# Patient Record
Sex: MALE | Race: BLACK OR AFRICAN AMERICAN | NOT HISPANIC OR LATINO | URBAN - METROPOLITAN AREA
[De-identification: names, ages, dates, MRNs, and addresses within clinical notes are randomized per-mention and may not be internally consistent; named-entity substitution may affect disease eponyms.]

---

## 2024-01-20 ENCOUNTER — HOSPITAL ENCOUNTER (EMERGENCY)
Facility: HOSPITAL | Age: 44
Discharge: HOME | End: 2024-01-20
Attending: EMERGENCY MEDICINE | Admitting: EMERGENCY MEDICINE

## 2024-01-20 VITALS
SYSTOLIC BLOOD PRESSURE: 144 MMHG | DIASTOLIC BLOOD PRESSURE: 69 MMHG | OXYGEN SATURATION: 99 % | HEART RATE: 89 BPM | WEIGHT: 235.1 LBS | TEMPERATURE: 97.3 F | RESPIRATION RATE: 18 BRPM

## 2024-01-20 DIAGNOSIS — R11.2 NAUSEA AND VOMITING, UNSPECIFIED VOMITING TYPE: Primary | ICD-10-CM

## 2024-01-20 LAB
ALBUMIN SERPL BCP-MCNC: 4.2 G/DL (ref 3.5–5)
ALP SERPL-CCNC: 38 U/L (ref 34–104)
ALT SERPL W P-5'-P-CCNC: 18 U/L (ref 7–52)
ANION GAP SERPL CALCULATED.3IONS-SCNC: 7 MMOL/L
AST SERPL W P-5'-P-CCNC: 21 U/L (ref 13–39)
BASOPHILS # BLD AUTO: 0.02 THOUSANDS/ÂΜL (ref 0–0.1)
BASOPHILS NFR BLD AUTO: 0 % (ref 0–1)
BILIRUB SERPL-MCNC: 0.55 MG/DL (ref 0.2–1)
BUN SERPL-MCNC: 14 MG/DL (ref 5–25)
CALCIUM SERPL-MCNC: 9.4 MG/DL (ref 8.4–10.2)
CHLORIDE SERPL-SCNC: 109 MMOL/L (ref 96–108)
CO2 SERPL-SCNC: 22 MMOL/L (ref 21–32)
CREAT SERPL-MCNC: 0.98 MG/DL (ref 0.6–1.3)
EOSINOPHIL # BLD AUTO: 0.13 THOUSAND/ÂΜL (ref 0–0.61)
EOSINOPHIL NFR BLD AUTO: 1 % (ref 0–6)
ERYTHROCYTE [DISTWIDTH] IN BLOOD BY AUTOMATED COUNT: 12.9 % (ref 11.6–15.1)
GFR SERPL CREATININE-BSD FRML MDRD: 94 ML/MIN/1.73SQ M
GLUCOSE SERPL-MCNC: 93 MG/DL (ref 65–140)
HCT VFR BLD AUTO: 45.1 % (ref 36.5–49.3)
HGB BLD-MCNC: 14.4 G/DL (ref 12–17)
IMM GRANULOCYTES # BLD AUTO: 0.04 THOUSAND/UL (ref 0–0.2)
IMM GRANULOCYTES NFR BLD AUTO: 0 % (ref 0–2)
LIPASE SERPL-CCNC: 80 U/L (ref 11–82)
LYMPHOCYTES # BLD AUTO: 1.27 THOUSANDS/ÂΜL (ref 0.6–4.47)
LYMPHOCYTES NFR BLD AUTO: 13 % (ref 14–44)
MCH RBC QN AUTO: 27.5 PG (ref 26.8–34.3)
MCHC RBC AUTO-ENTMCNC: 31.9 G/DL (ref 31.4–37.4)
MCV RBC AUTO: 86 FL (ref 82–98)
MONOCYTES # BLD AUTO: 0.67 THOUSAND/ÂΜL (ref 0.17–1.22)
MONOCYTES NFR BLD AUTO: 7 % (ref 4–12)
NEUTROPHILS # BLD AUTO: 7.39 THOUSANDS/ÂΜL (ref 1.85–7.62)
NEUTS SEG NFR BLD AUTO: 79 % (ref 43–75)
NRBC BLD AUTO-RTO: 0 /100 WBCS
PLATELET # BLD AUTO: 141 THOUSANDS/UL (ref 149–390)
PMV BLD AUTO: 10.3 FL (ref 8.9–12.7)
POTASSIUM SERPL-SCNC: 3.8 MMOL/L (ref 3.5–5.3)
PROT SERPL-MCNC: 7.4 G/DL (ref 6.4–8.4)
RBC # BLD AUTO: 5.23 MILLION/UL (ref 3.88–5.62)
SODIUM SERPL-SCNC: 138 MMOL/L (ref 135–147)
WBC # BLD AUTO: 9.52 THOUSAND/UL (ref 4.31–10.16)

## 2024-01-20 PROCEDURE — 36415 COLL VENOUS BLD VENIPUNCTURE: CPT

## 2024-01-20 PROCEDURE — 99284 EMERGENCY DEPT VISIT MOD MDM: CPT | Performed by: EMERGENCY MEDICINE

## 2024-01-20 PROCEDURE — 80053 COMPREHEN METABOLIC PANEL: CPT

## 2024-01-20 PROCEDURE — 83690 ASSAY OF LIPASE: CPT

## 2024-01-20 PROCEDURE — 99283 EMERGENCY DEPT VISIT LOW MDM: CPT

## 2024-01-20 PROCEDURE — 85025 COMPLETE CBC W/AUTO DIFF WBC: CPT

## 2024-01-20 RX ORDER — ONDANSETRON 4 MG/1
4 TABLET, FILM COATED ORAL EVERY 8 HOURS PRN
Qty: 20 TABLET | Refills: 0 | Status: SHIPPED | OUTPATIENT
Start: 2024-01-20

## 2024-01-20 RX ORDER — ONDANSETRON 4 MG/1
4 TABLET, ORALLY DISINTEGRATING ORAL ONCE
Status: COMPLETED | OUTPATIENT
Start: 2024-01-20 | End: 2024-01-20

## 2024-01-20 RX ADMIN — ONDANSETRON 4 MG: 4 TABLET, ORALLY DISINTEGRATING ORAL at 09:25

## 2024-01-20 NOTE — ED PROVIDER NOTES
"History  Chief Complaint   Patient presents with    Vomiting     Vomiting since this morning, still feeling nauseated. Wife said that \"this is unlike him we both had the same food last night and I feel fine\" pt adds that he has a cramping and after he vomited he briefly felt better so attempted to drink water and \"it came back up\"     43-year-old male who presents to the emergency department for nausea and vomiting started early this morning.  Patient states he had tacos and febrile last night for dinner and has not eaten since.  Reports continued nausea at this time.  Patient's wife states that patient does not go see a doctor however it is unlike him to be \"sick\".  Family history of diabetes in father as well as history of thyroid cancer in family members.  Patient states he had abdominal pain when symptoms initially started however that has resolved.  Patient denies any chest pain, shortness of breath, fever, hematemesis, diarrhea, constipation, dysuria, hematuria, back pain, hematochezia.       History provided by:  Patient      None       History reviewed. No pertinent past medical history.    History reviewed. No pertinent surgical history.    History reviewed. No pertinent family history.  I have reviewed and agree with the history as documented.    E-Cigarette/Vaping     E-Cigarette/Vaping Substances     Social History     Tobacco Use    Smoking status: Never    Smokeless tobacco: Never   Substance Use Topics    Alcohol use: Not Currently    Drug use: Yes     Types: Marijuana        Review of Systems   Constitutional:  Negative for chills and fever.   HENT:  Negative for ear pain and sore throat.    Eyes:  Negative for pain and visual disturbance.   Respiratory:  Negative for cough and shortness of breath.    Cardiovascular:  Negative for chest pain and palpitations.   Gastrointestinal:  Positive for abdominal pain (resolved), nausea and vomiting. Negative for blood in stool, constipation and diarrhea.       "  Denies hematemesis, hematochezia   Genitourinary:  Negative for dysuria and hematuria.   Musculoskeletal:  Negative for arthralgias and back pain.   Skin:  Negative for color change and rash.   Neurological:  Negative for seizures and syncope.   All other systems reviewed and are negative.      Physical Exam  ED Triage Vitals [01/20/24 0908]   Temperature Pulse Respirations Blood Pressure SpO2   (!) 97.3 °F (36.3 °C) 89 18 144/69 99 %      Temp Source Heart Rate Source Patient Position - Orthostatic VS BP Location FiO2 (%)   Tympanic Monitor Sitting Left arm --      Pain Score       5             Orthostatic Vital Signs  Vitals:    01/20/24 0908   BP: 144/69   Pulse: 89   Patient Position - Orthostatic VS: Sitting       Physical Exam  Vitals and nursing note reviewed.   Constitutional:       General: He is not in acute distress.     Appearance: He is well-developed.   HENT:      Head: Normocephalic and atraumatic.      Right Ear: External ear normal.      Left Ear: External ear normal.      Mouth/Throat:      Mouth: Mucous membranes are moist.   Eyes:      Conjunctiva/sclera: Conjunctivae normal.   Cardiovascular:      Rate and Rhythm: Normal rate and regular rhythm.      Heart sounds: No murmur heard.  Pulmonary:      Effort: Pulmonary effort is normal. No respiratory distress.      Breath sounds: Normal breath sounds.   Abdominal:      Palpations: Abdomen is soft.      Tenderness: There is no abdominal tenderness. There is no guarding or rebound.   Musculoskeletal:         General: No swelling.      Cervical back: Neck supple.   Skin:     General: Skin is warm and dry.      Capillary Refill: Capillary refill takes less than 2 seconds.   Neurological:      General: No focal deficit present.      Mental Status: He is alert and oriented to person, place, and time.   Psychiatric:         Mood and Affect: Mood normal.         ED Medications  Medications   ondansetron (ZOFRAN-ODT) dispersible tablet 4 mg (4 mg Oral  Given 1/20/24 0925)       Diagnostic Studies  Results Reviewed       Procedure Component Value Units Date/Time    Comprehensive metabolic panel [[id number]]  (Abnormal) Collected: 01/20/24 0936    Lab Status: Final result Specimen: Blood from Hand, Right Updated: 01/20/24 1004     Sodium 138 mmol/L      Potassium 3.8 mmol/L      Chloride 109 mmol/L      CO2 22 mmol/L      ANION GAP 7 mmol/L      BUN 14 mg/dL      Creatinine 0.98 mg/dL      Glucose 93 mg/dL      Calcium 9.4 mg/dL      AST 21 U/L      ALT 18 U/L      Alkaline Phosphatase 38 U/L      Total Protein 7.4 g/dL      Albumin 4.2 g/dL      Total Bilirubin 0.55 mg/dL      eGFR 94 ml/min/1.73sq m     Narrative:      National Kid[employer] Foundation guidelines for Chronic Kidney Disease (CKD):     Stage 1 with normal or high GFR (GFR > 90 mL/min/1.73 square meters)    Stage 2 Mild CKD (GFR = 60-89 mL/min/1.73 square meters)    Stage 3A Moderate CKD (GFR = 45-59 mL/min/1.73 square meters)    Stage 3B Moderate CKD (GFR = 30-44 mL/min/1.73 square meters)    Stage 4 Severe CKD (GFR = 15-29 mL/min/1.73 square meters)    Stage 5 End Stage CKD (GFR <15 mL/min/1.73 square meters)  Note: GFR calculation is accurate only with a steady state creatinine    Lipase [[id number]]  (Normal) Collected: 01/20/24 0936    Lab Status: Final result Specimen: Blood from Hand, Right Updated: 01/20/24 1004     Lipase 80 u/L     CBC and differential [[id number]]  (Abnormal) Collected: 01/20/24 0936    Lab Status: Final result Specimen: Blood from Hand, Right Updated: 01/20/24 0943     WBC 9.52 Thousand/uL      RBC 5.23 Million/uL      Hemoglobin 14.4 g/dL      Hematocrit 45.1 %      MCV 86 fL      MCH 27.5 pg      MCHC 31.9 g/dL      RDW 12.9 %      MPV 10.3 fL      Platelets 141 Thousands/uL      nRBC 0 /100 WBCs      Neutrophils Relative 79 %      Immat GRANS % 0 %      Lymphocytes Relative 13 %      Monocytes Relative 7 %      Eosinophils Relative 1 %      Basophils Relative 0 %       "Neutrophils Absolute 7.39 Thousands/µL      Immature Grans Absolute 0.04 Thousand/uL      Lymphocytes Absolute 1.27 Thousands/µL      Monocytes Absolute 0.67 Thousand/µL      Eosinophils Absolute 0.13 Thousand/µL      Basophils Absolute 0.02 Thousands/µL                    No orders to display         Procedures  Procedures      ED Course  ED Course as of 01/20/24 1448   Sat Jan 20, 2024   0943 CBC and differential(!)  No leukocytosis or leukopenia.  Hemoglobin hematocrit within normal was.  Slightly decreased platelets.  Differential reassuring.   1009 Comprehensive metabolic panel(!)  Electrolytes within normal limits except for a slightly elevated chloride.  Normal liver function testing.  Normal kidney function testing.   1010 Lipase: 80  Unlikely pancreatitis                             SBIRT 20yo+      Flowsheet Row Most Recent Value   Initial Alcohol Screen: US AUDIT-C     1. How often do you have a drink containing alcohol? 0 Filed at: 01/20/2024 0927   2. How many drinks containing alcohol do you have on a typical day you are drinking?  0 Filed at: 01/20/2024 0927   3a. Male UNDER 65: How often do you have five or more drinks on one occasion? 0 Filed at: 01/20/2024 0927   3b. FEMALE Any Age, or MALE 65+: How often do you have 4 or more drinks on one occassion? 0 Filed at: 01/20/2024 0927   Audit-C Score 0 Filed at: 01/20/2024 0927   NELSON: How many times in the past year have you...    Used an illegal drug or used a prescription medication for non-medical reasons? Daily or Almost Daily Filed at: 01/20/2024 0927   DAST-10: In the past 12 months...    1. Have you used drugs other than those required for medical reasons? 1 Filed at: 01/20/2024 0927   2. Do you use more than one drug at a time? 0 Filed at: 01/20/2024 0927   3. Have you had medical problems as a result of your drug use (e.g., memory loss, hepatitis, convulsions, bleeding, etc.)? 0 Filed at: 01/20/2024 0927   4. Have you had \"blackouts\" or " "\"flashbacks\" as a result of drug use?YesNo 0 Filed at: 01/20/2024 0927   5. Do you ever feel bad or guilty about your drug use? 0 Filed at: 01/20/2024 0927   6. Does your spouse (or parent) ever complain about your involvement with drugs? 0 Filed at: 01/20/2024 0927   7. Have you neglected your family because of your use of drugs? 0 Filed at: 01/20/2024 0927   8. Have you engaged in illegal activities in order to obtain drugs? 0 Filed at: 01/20/2024 0927   9. Have you ever experienced withdrawal symptoms (felt sick) when you stopped taking drugs? 0 Filed at: 01/20/2024 0927   10. Are you always able to stop using drugs when you want to? 0 Filed at: 01/20/2024 0927   DAST-10 Score 1 Filed at: 01/20/2024 0927                  Medical Decision Making  42 yo M presented to ED for nausea and vomiting. Associated symptoms: Abdominal cramping, that has resolved.  No associated diarrhea, urinary symptoms, hematemesis, blood in stool, chest pain, shortness of breath.  Exam findings: Heart was regular rate and rhythm, lungs are clear to auscultation bilaterally, abdomen soft nontender, positive bowel sounds.  Differentials diagnoses considered: Pancreatitis versus viral gastritis versus GERD. Patient was given Zofran for nausea and vomiting. On re-examination, patient had improvement in symptoms. Obtained CBC, CMP, lipase, which labs were unremarkable. Based on these results and H&P, viral gastritis. Results and clinical impressions were discussed with patient and family. They expressed understanding. Plan: Charge home with follow-up to primary care, instructed to return the emergency department for any worsening symptoms, prescription for Zofran for nausea. This plan was also discussed with patient, who was agreeable with this plan. Patient was given the opportunity to ask questions in ED. All questions and concerns were addressed in ED.     Amount and/or Complexity of Data Reviewed  Labs: ordered. Decision-making details " documented in ED Course.    Risk  Prescription drug management.          Disposition  Final diagnoses:   Nausea and vomiting, unspecified vomiting type     Time reflects when diagnosis was documented in both MDM as applicable and the Disposition within this note       Time User Action Codes Description Comment    1/20/2024 10:14 AM Jen Douglas Add [R11.2] Nausea and vomiting, unspecified vomiting type           ED Disposition       ED Disposition   Discharge    Condition   Stable    Date/Time   Sat Jan 20, 2024 1014    Comment   Danie Trent discharge to home/self care.                   Follow-up Information       Follow up With Specialties Details Why Contact Info Additional Information    [facility] Emergency Department Emergency Medicine Go to  As needed, If symptoms worsen [address] [facility] [id number] [phone] [facility] Emergency Department    [facility] Schedule an appointment as soon as possible for a visit   [address]  [address]  [facility] [id number] [phone] [facility], [address], [place], [place], [id number] [phone]            Discharge Medication List as of 1/20/2024 10:18 AM        START taking these medications    Details   ondansetron (ZOFRAN) 4 mg tablet Take 1 tablet (4 mg total) by mouth every 8 (eight) hours as needed for nausea or vomiting, Starting Sat 1/20/2024, Normal               PDMP Review       None             ED Provider  Attending physically available and evaluated Danie Trent. I managed the patient along with the ED Attending.    Electronically Signed by           Jen Douglas DO  01/20/24 1388

## 2024-01-20 NOTE — ED ATTENDING ATTESTATION
1/20/2024  IMitch MD, saw and evaluated the patient. I have discussed the patient with the resident/non-physician practitioner and agree with the resident's/non-physician practitioner's findings, Plan of Care, and MDM as documented in the resident's/non-physician practitioner's note, except where noted. All available labs and Radiology studies were reviewed.  I was present for key portions of any procedure(s) performed by the resident/non-physician practitioner and I was immediately available to provide assistance.       At this point I agree with the current assessment done in the Emergency Department.  I have conducted an independent evaluation of this patient a history and physical is as follows:    ED Course     43-year-old male presenting with nausea vomiting.  Symptoms started this morning.  Patient also endorses mild abdominal discomfort.  States that he ate tackle last night but denies any new food items.  Denies sick contacts or recent travels.  Also reports no chest pain or shortness of breath.  Denies any abdominal surgery.  Reports no urinary symptoms.  Does not have any other complaints.      Exam: Patient is well-appearing with no acute distress.  On exam patient has no abdominal tenderness.  No CVA tenderness noted as well.  Lung exam is clear to auscultation bilaterally.  Heart exam with no rubs murmurs or gallops and no arrhythmia.    Plan: Will give symptomatic treatment. Abdominal labs     Critical Care Time  Procedures